# Patient Record
Sex: MALE
[De-identification: names, ages, dates, MRNs, and addresses within clinical notes are randomized per-mention and may not be internally consistent; named-entity substitution may affect disease eponyms.]

---

## 2019-04-16 ENCOUNTER — HOSPITAL ENCOUNTER (EMERGENCY)
Dept: HOSPITAL 62 - ER | Age: 29
LOS: 1 days | Discharge: LEFT BEFORE DISCHARGE | End: 2019-04-17

## 2019-04-16 DIAGNOSIS — Z53.21: Primary | ICD-10-CM

## 2019-06-10 ENCOUNTER — HOSPITAL ENCOUNTER (EMERGENCY)
Dept: HOSPITAL 62 - ER | Age: 29
Discharge: HOME | End: 2019-06-10
Payer: SELF-PAY

## 2019-06-10 VITALS — SYSTOLIC BLOOD PRESSURE: 133 MMHG | DIASTOLIC BLOOD PRESSURE: 84 MMHG

## 2019-06-10 DIAGNOSIS — F17.200: ICD-10-CM

## 2019-06-10 DIAGNOSIS — M54.9: ICD-10-CM

## 2019-06-10 DIAGNOSIS — M54.2: ICD-10-CM

## 2019-06-10 DIAGNOSIS — G89.29: ICD-10-CM

## 2019-06-10 DIAGNOSIS — R26.9: Primary | ICD-10-CM

## 2019-06-10 PROCEDURE — 72110 X-RAY EXAM L-2 SPINE 4/>VWS: CPT

## 2019-06-10 PROCEDURE — 99284 EMERGENCY DEPT VISIT MOD MDM: CPT

## 2019-06-10 PROCEDURE — 70450 CT HEAD/BRAIN W/O DYE: CPT

## 2019-06-10 PROCEDURE — 72125 CT NECK SPINE W/O DYE: CPT

## 2019-06-10 PROCEDURE — 72070 X-RAY EXAM THORAC SPINE 2VWS: CPT

## 2019-06-10 NOTE — RADIOLOGY REPORT (SQ)
EXAM DESCRIPTION:  L SPINE WHOLE; T SPINE AP/LAT



COMPLETED DATE/TIME:  6/10/2019 6:51 pm



REASON FOR STUDY:  pain



COMPARISON:  None.



FINDINGS:  Two view thoracic spine:  Very slight curvature.  No fracture.  Soft tissues normal.  No r
ib fracture or pneumothorax.

Five view lumbosacral spine:  Congenital segmentation anomaly, L5 looks sacralized.  Otherwise normal
.  No malalignment.  Discs are preserved.  No fracture.  Posterior elements intact.



TECHNICAL DOCUMENTATION:  JOB ID:  [id number]



Reading location - IP/workstation name: [email or web address]

## 2019-06-10 NOTE — RADIOLOGY REPORT (SQ)
EXAM DESCRIPTION:  CT CERVICAL SPINE WITHOUT; CT HEAD WITHOUT



COMPLETED DATE/TIME:  6/10/2019 6:42 pm



REASON FOR STUDY:  neck pain; balance issues



COMPARISON:  None.



TECHNIQUE:  Axial images acquired through the brain and cervical spine without intravenous contrast. 
 Images reviewed with brain, subdural, lung, soft tissue and bone windows.  Reconstructed coronal and
 sagittal MPR images reviewed.  Images stored on PACS.

All CT scanners at this facility use dose modulation, iterative reconstruction, and/or weight based d
osing when appropriate to reduce radiation dose to as low as reasonably achievable (ALARA).

[facility]: Dose Right  [facility]: CareDose    [facility]: Dose Right    [facility]: Teradose 4D    [facility]: Smart [place]



RADIATION DOSE:  CT Rad equipment meets quality standard of care and radiation dose reduction techniq
ues were employed. CTDIvol: 11.7 mGy. DLP: 275 mGy-cm.; CT Rad equipment meets quality standard of ca
re and radiation dose reduction techniques were employed. CTDIvol: 53.2 mGy. DLP: 1124 mGy-cm. mGy.



LIMITATIONS:  None.



FINDINGS:  Brain

Normal.  No hemorrhage or mass or shift or hydrocephalus.  No fracture.  Orbits intact with clear par
anasal sinuses.

Cervical spine

Normal alignment.  No fracture.  Soft tissues normal.



IMPRESSION:

1. No acute intracranial abnormality.

2. No acute cervical spine abnormality.



TECHNICAL DOCUMENTATION:  JOB ID:  [id number]

Quality ID # [id number]: Final reports with documentation of one or more dose reduction techniques (e.g., Au
tomated exposure control, adjustment of the mA and/or kV according to patient size, use of iterative 
reconstruction technique)

 2011 [employer]- All Rights Reserved



Reading location - IP/workstation name: [email or web address]

## 2019-06-10 NOTE — RADIOLOGY REPORT (SQ)
EXAM DESCRIPTION:  CT CERVICAL SPINE WITHOUT; CT HEAD WITHOUT



COMPLETED DATE/TIME:  6/10/2019 6:42 pm



REASON FOR STUDY:  neck pain; balance issues



COMPARISON:  None.



TECHNIQUE:  Axial images acquired through the brain and cervical spine without intravenous contrast. 
 Images reviewed with brain, subdural, lung, soft tissue and bone windows.  Reconstructed coronal and
 sagittal MPR images reviewed.  Images stored on PACS.

All CT scanners at this facility use dose modulation, iterative reconstruction, and/or weight based d
osing when appropriate to reduce radiation dose to as low as reasonably achievable (ALARA).

[facility]: Dose Right  [facility]: CareDose    [facility]: Dose Right    [facility]: Teradose 4D    [facility]: Smart [employer]



RADIATION DOSE:  CT Rad equipment meets quality standard of care and radiation dose reduction techniq
ues were employed. CTDIvol: 11.7 mGy. DLP: 275 mGy-cm.; CT Rad equipment meets quality standard of ca
re and radiation dose reduction techniques were employed. CTDIvol: 53.2 mGy. DLP: 1124 mGy-cm. mGy.



LIMITATIONS:  None.



FINDINGS:  Brain

Normal.  No hemorrhage or mass or shift or hydrocephalus.  No fracture.  Orbits intact with clear par
anasal sinuses.

Cervical spine

Normal alignment.  No fracture.  Soft tissues normal.



IMPRESSION:

1. No acute intracranial abnormality.

2. No acute cervical spine abnormality.



TECHNICAL DOCUMENTATION:  JOB ID:  [id number]

Quality ID # 436: Final reports with documentation of one or more dose reduction techniques (e.g., Au
tomated exposure control, adjustment of the mA and/or kV according to patient size, use of iterative 
reconstruction technique)

 2011 [employer]- All Rights Reserved



Reading location - IP/workstation name: [email or web address]

## 2019-06-10 NOTE — RADIOLOGY REPORT (SQ)
EXAM DESCRIPTION:  L SPINE WHOLE; T SPINE AP/LAT



COMPLETED DATE/TIME:  6/10/2019 6:51 pm



REASON FOR STUDY:  pain



COMPARISON:  None.



FINDINGS:  Two view thoracic spine:  Very slight curvature.  No fracture.  Soft tissues normal.  No r
ib fracture or pneumothorax.

Five view lumbosacral spine:  Congenital segmentation anomaly, L5 looks sacralized.  Otherwise normal
.  No malalignment.  Discs are preserved.  No fracture.  Posterior elements intact.



TECHNICAL DOCUMENTATION:  JOB ID:  [id number]



Reading location - IP/workstation name: [email or web address]

## 2022-02-09 NOTE — ER DOCUMENT REPORT
ED General





- General


Chief Complaint: Back Pain


Stated Complaint: BACK PAIN


Time Seen by Provider: 06/10/19 17:42


Mode of Arrival: Ambulatory


Information source: Patient


TRAVEL OUTSIDE OF THE [place] IN LAST 30 DAYS: No





- HPI


Patient complains to provider of: Back and neck pain


Onset: Other - Chronic


Onset/Duration: Constant


Quality of pain: Sharp


Severity: Severe


Pain Level: 4


Associated symptoms: None


Exacerbated by: Movement, Walking


Relieved by: Denies


Similar symptoms previously: No


Recently seen / treated by doctor: No


Notes: 





28-year-old -American male coming in today with chief complaint ongoing 

spine pain.  Patient says that is getting harder and harder to get up out of bed

and walk.  He says it feels like his body is pulling him in one direction 

instead of going the direction he is trying to go.  He has no brain injuries in 

the past.  No trauma to his head or neck.  He states his pain extends from the 

back of his head all the way down to his lumbar spine.  No bladder or bowel 

dysfunction.  No saddle anesthesia.  No focal weakness.





- Related Data


Allergies/Adverse Reactions: 


                                        





No Known Allergies Allergy (Verified 06/10/19 15:29)


   











Past Medical History





- Social History


Smoking Status: Smoker,Current Status Unk


Family History: Reviewed & Not Pertinent


Renal/ Medical History: Denies: Hx Peritoneal Dialysis


Psychiatric Medical History: Reports: Hx Depression





Physical Exam





- Vital signs


Vitals: 


                                        











Temp Pulse Resp BP Pulse Ox


 


 97.9 F   100   18   128/69 H  99 


 


 06/10/19 15:35  06/10/19 15:35  06/10/19 15:35  06/10/19 15:35  06/10/19 15:35














- Notes


Notes: 





General: Well-developed, well-nourished. In no acute distress. Non-toxic 

appearing.





Cardiac: Well-perfused. Regular rate and rhythm. No murmurs, rubs, or gallops. 





Pulmonary: No respiratory distress. No cyanosis. Bilateral lung fiels are clear 

to auscultation.





Abdominal: Non-distended. Non-rigid. Bowels sounds are present in all four 

quadrants. No guarding or rebound.





HEENT: Head is atraumatic. Conjunctivae not reddened. No tearing. PERRL. EOMI. 

Orbits atraumatic. No periorbital swelling or erythema. Oropharynx is without 

erythema, swelling, or exudates.





Neck: Supple. No adenopathy. No meningismus.





Dermatologic: Warm with good turgor. No rash. Atraumatic.





Chest: Atraumatic. No chest wall tenderness to palpation.





Musculoskeletal: Midline tenderness from cervical through lumbar spine.  No 

step-off





Genitourinary: Examination deferred





Neurologic: No gross neurologic deficits.





Psychiatric: Normal mood. 











Course





- Re-evaluation


Re-evalutation: 





06/10/19 18:26


Scan of head and C-spine.  Plain films ordered.


06/10/19 19:56


Scans and x-rays all negative.  Patient aware.  We will refer him to orthopedic 

specialist.  We will send him home with pain medication and muscle relaxants.





- Vital Signs


Vital signs: 


                                        











Temp Pulse Resp BP Pulse Ox


 


 97.9 F   100   18   128/69 H  99 


 


 06/10/19 15:35  06/10/19 15:35  06/10/19 15:35  06/10/19 15:35  06/10/19 15:35














Discharge





- Discharge


Clinical Impression: 


 Gait disturbance





Back pain


Qualifiers:


 Back pain location: back pain in unspecified location Chronicity: chronic Back 

pain laterality: unspecified Qualified Code(s): M54.9 - Dorsalgia, unspecified; 

G89.29 - Other chronic pain





Condition: Good


Disposition: HOME, SELF-CARE


Instructions:  Ice Packs ([facility]), Low Back Pain ([facility]), Muscle Strain ([facility]), Oral 

Narcotic Medication ([facility]), Warm Packs ([facility])


Prescriptions: 


Hydrocodone/Acetaminophen [Norco 5-325 mg Tablet] 1 tab PO Q6HP PRN #12 tablet


 PRN Reason: 


Tizanidine HCl [Zanaflex 4 Mg Tablet] 4 mg PO Q8HP PRN #12 tablet


 PRN Reason: 


Referrals: 


JOSE JUAREZ MD [ACTIVE STAFF] - Follow up as needed
No